# Patient Record
Sex: MALE | Race: WHITE | NOT HISPANIC OR LATINO | Employment: UNEMPLOYED | URBAN - METROPOLITAN AREA
[De-identification: names, ages, dates, MRNs, and addresses within clinical notes are randomized per-mention and may not be internally consistent; named-entity substitution may affect disease eponyms.]

---

## 2019-12-01 ENCOUNTER — HOSPITAL ENCOUNTER (EMERGENCY)
Facility: OTHER | Age: 29
Discharge: HOME | End: 2019-12-01
Attending: EMERGENCY MEDICINE
Payer: MEDICAID

## 2019-12-01 VITALS
WEIGHT: 210 LBS | HEIGHT: 68 IN | BODY MASS INDEX: 31.83 KG/M2 | DIASTOLIC BLOOD PRESSURE: 70 MMHG | HEART RATE: 67 BPM | RESPIRATION RATE: 17 BRPM | OXYGEN SATURATION: 99 % | TEMPERATURE: 98 F | SYSTOLIC BLOOD PRESSURE: 119 MMHG

## 2019-12-01 DIAGNOSIS — R09.82 PND (POST-NASAL DRIP): ICD-10-CM

## 2019-12-01 DIAGNOSIS — J40 BRONCHITIS: ICD-10-CM

## 2019-12-01 DIAGNOSIS — R05.9 COUGH: ICD-10-CM

## 2019-12-01 DIAGNOSIS — J06.9 VIRAL URI WITH COUGH: Primary | ICD-10-CM

## 2019-12-01 PROCEDURE — 96372 THER/PROPH/DIAG INJ SC/IM: CPT

## 2019-12-01 PROCEDURE — 63600175 PHARM REV CODE 636 W HCPCS: Performed by: NURSE PRACTITIONER

## 2019-12-01 PROCEDURE — 99284 EMERGENCY DEPT VISIT MOD MDM: CPT | Mod: 25

## 2019-12-01 RX ORDER — FLUTICASONE PROPIONATE 50 MCG
1 SPRAY, SUSPENSION (ML) NASAL 2 TIMES DAILY PRN
Qty: 15 G | Refills: 0 | Status: SHIPPED | OUTPATIENT
Start: 2019-12-01

## 2019-12-01 RX ORDER — LEVOCETIRIZINE DIHYDROCHLORIDE 5 MG/1
5 TABLET, FILM COATED ORAL NIGHTLY
Qty: 30 TABLET | Refills: 0 | Status: SHIPPED | OUTPATIENT
Start: 2019-12-01 | End: 2019-12-31

## 2019-12-01 RX ORDER — DEXAMETHASONE SODIUM PHOSPHATE 4 MG/ML
8 INJECTION, SOLUTION INTRA-ARTICULAR; INTRALESIONAL; INTRAMUSCULAR; INTRAVENOUS; SOFT TISSUE
Status: COMPLETED | OUTPATIENT
Start: 2019-12-01 | End: 2019-12-01

## 2019-12-01 RX ORDER — METHYLPREDNISOLONE 4 MG/1
TABLET ORAL
Qty: 1 PACKAGE | Refills: 0 | Status: SHIPPED | OUTPATIENT
Start: 2019-12-01 | End: 2019-12-22

## 2019-12-01 RX ADMIN — DEXAMETHASONE SODIUM PHOSPHATE 8 MG: 4 INJECTION, SOLUTION INTRA-ARTICULAR; INTRALESIONAL; INTRAMUSCULAR; INTRAVENOUS; SOFT TISSUE at 12:12

## 2019-12-01 NOTE — ED TRIAGE NOTES
Patient presents to ER with c/o cough, sore throat and nasal congestion for 2 weeks.  Patient reports taking several OTC medications with no relieve.  Patient denies chest pain and sob.

## 2019-12-01 NOTE — ED PROVIDER NOTES
Encounter Date: 12/1/2019       History     Chief Complaint   Patient presents with    Cough     Pt c/o productive cough (yellow-green mucous), sore throat, rhinorrhea X 7 days.      28 y/o male with presents to the emergency room with cough, runny nose, sinus congestion and sore itchy throat for 2 weeks. Denies fever, body aches. Pt is having vomiting in the morning only and is unsure if it is related to him coughing in the morning. The cough is worse at night.  The patient currently smokes and states that his roommates also smoke inside.    The history is provided by the patient.     Review of patient's allergies indicates:   Allergen Reactions    Penicillins Other (See Comments)     syncope     History reviewed. No pertinent past medical history.  History reviewed. No pertinent surgical history.  History reviewed. No pertinent family history.  Social History     Tobacco Use    Smoking status: Current Every Day Smoker     Types: Cigarettes, Vaping with nicotine    Smokeless tobacco: Current User   Substance Use Topics    Alcohol use: Yes     Comment: occasional    Drug use: Never     Review of Systems   Constitutional: Negative for fever.   HENT: Positive for congestion, postnasal drip, rhinorrhea and sore throat.    Respiratory: Positive for cough (Productive green and yellow). Negative for shortness of breath.    Cardiovascular: Negative for chest pain.   Gastrointestinal: Negative for nausea.   Genitourinary: Negative for dysuria.   Musculoskeletal: Negative for back pain.   Skin: Negative for rash.   Neurological: Negative for weakness.   Hematological: Does not bruise/bleed easily.   All other systems reviewed and are negative.    Physical Exam     Initial Vitals [12/01/19 1058]   BP Pulse Resp Temp SpO2   116/66 96 18 98.5 °F (36.9 °C) 96 %      MAP       --         Physical Exam    Nursing note and vitals reviewed.  Constitutional: He appears well-developed and well-nourished.   HENT:   Head:  Normocephalic and atraumatic.   Right Ear: Hearing, external ear and ear canal normal. Tympanic membrane is bulging. A middle ear effusion (Clear) is present.   Left Ear: Hearing, external ear and ear canal normal. Tympanic membrane is bulging. A middle ear effusion ( clear) is present.   Nose: Rhinorrhea ( clear) present. Right sinus exhibits no maxillary sinus tenderness and no frontal sinus tenderness. Left sinus exhibits no maxillary sinus tenderness and no frontal sinus tenderness.   Mouth/Throat: Uvula is midline, oropharynx is clear and moist and mucous membranes are normal. No posterior oropharyngeal edema or posterior oropharyngeal erythema.   Swollen turbinates noted to bilateral nares and cobblestone appearance to posterior oropharynx   Eyes: Conjunctivae and EOM are normal. Pupils are equal, round, and reactive to light.   Cardiovascular: Normal rate, regular rhythm, normal heart sounds and intact distal pulses. Exam reveals no gallop and no friction rub.    No murmur heard.  Pulmonary/Chest: Breath sounds normal. No respiratory distress. He has no wheezes. He has no rhonchi. He has no rales. He exhibits no tenderness.   Abdominal: Soft. Bowel sounds are normal. He exhibits no distension. There is no tenderness.   Musculoskeletal: Normal range of motion. He exhibits no edema.   Neurological: He is alert and oriented to person, place, and time. He has normal strength. GCS score is 15. GCS eye subscore is 4. GCS verbal subscore is 5. GCS motor subscore is 6.       ED Course   Procedures  Labs Reviewed - No data to display       Imaging Results          X-Ray Chest PA And Lateral (Final result)  Result time 12/01/19 12:15:14    Final result by Jose Randolph MD (12/01/19 12:15:14)                 Impression:      1. No acute cardiopulmonary process.      Electronically signed by: Jose Randolph MD  Date:    12/01/2019  Time:    12:15             Narrative:    EXAMINATION:  XR CHEST PA AND  LATERAL    CLINICAL HISTORY:  Cough    TECHNIQUE:  PA and lateral views of the chest were performed.    COMPARISON:  None    FINDINGS:  The cardiomediastinal silhouette is not enlarged..  There is no pleural effusion.  The trachea is midline.  The lungs are symmetrically expanded bilaterally without evidence of acute parenchymal process. No large focal consolidation seen.  There is no pneumothorax.  The osseous structures are unremarkable.                                 Medical Decision Making:   Initial Assessment:   30 y/o male with presents to the emergency room with cough, runny nose, sinus congestion and sore itchy throat for 2 weeks. Denies fever, body aches. Pt is having vomiting in the morning only and is unsure if it is related to him coughing in the morning. The cough is worse at night.  The patient currently smokes and states that his roommates also smoke inside.    Differential Diagnosis:   Viral URI, bronchitis, pneumonia, strep pharyngitis  Clinical Tests:   Radiological Study: Ordered and Reviewed  ED Management:  Patient examined has exam consistent with a viral URI versus bronchitis.  Patient will be discharged with Flonase, Zyrtec and a Medrol Dosepak.  Patient received a Decadron 8 mg IM injection prior to discharge. Patient given strict return precautions and voiced understanding of all discharge instructions. Pt was stable at discharge.                                      Clinical Impression:       ICD-10-CM ICD-9-CM   1. Viral URI with cough J06.9 465.9    B97.89    2. Cough R05 786.2   3. Bronchitis J40 490   4. PND (post-nasal drip) R09.82 784.91                             Gabriela Hagan, PENNIE  12/01/19 1213

## 2019-12-01 NOTE — DISCHARGE INSTRUCTIONS
Stop smoking if possible to help with your symptoms  Complete course of steroids provided  Take the flonase and zyrtec to help with other symptoms of congestion, runny nose and cough  Return with any new symptoms or worsening of condition